# Patient Record
Sex: MALE | Race: BLACK OR AFRICAN AMERICAN
[De-identification: names, ages, dates, MRNs, and addresses within clinical notes are randomized per-mention and may not be internally consistent; named-entity substitution may affect disease eponyms.]

---

## 2023-03-14 ENCOUNTER — NURSE TRIAGE (OUTPATIENT)
Dept: OTHER | Facility: CLINIC | Age: 58
End: 2023-03-14

## 2023-03-14 NOTE — TELEPHONE ENCOUNTER
Location of patient: [place]    Received call from Serafin at [facility] with [employer]. Subjective: Caller states \"I hit my head on the car door and I have started to have some sensitivity to light. \"     Current Symptoms: \"dull\" pain behind right eye socket and down right neck. Sensitivity to light- started 1 day after hitting head. Onset: 2 weeks ago; waxing and waning    Associated Symptoms: right eye is \"red\"- chronic per patient- wears contacts    Lump in left arm- \"upper forearm\"- girlfriend bit area of arm. Area is \"raised\"- happened a week ago. Pain Severity: 2/10; aching; mild- intermittent    Temperature: Denies    What has been tried: Half of an aleve    Recommended disposition: See in Office Today or Tomorrow    Care advice provided, patient verbalizes understanding; denies any other questions or concerns; instructed to call back for any new or worsening symptoms. Patient/Caller agrees with recommended disposition; writer provided warm transfer to [place] at [facility] for appointment scheduling    Patient is looking to establish care as a new patient. Understands if no new patient appointments in recommended time frame to be seen patient should go to nearest [facility] for evaluation. Attention Provider: Thank you for allowing me to participate in the care of your patient. The patient was connected to triage in response to information provided to the [facility]. Please do not respond through this encounter as the response is not directed to a shared pool.     Reason for Disposition   After 3 days and headache persists    Protocols used: Head Injury-ADULT-[place]